# Patient Record
Sex: MALE | Race: WHITE | NOT HISPANIC OR LATINO | Employment: OTHER | URBAN - METROPOLITAN AREA
[De-identification: names, ages, dates, MRNs, and addresses within clinical notes are randomized per-mention and may not be internally consistent; named-entity substitution may affect disease eponyms.]

---

## 2018-12-14 NOTE — PATIENT DISCUSSION
Recommended observation. Pt reports stable. Edu if any growth, irritation, scabbing, bleeding, lash loss, RTC for excision biopsy c KK/[id number].

## 2019-02-19 ENCOUNTER — IMPORTED ENCOUNTER (OUTPATIENT)
Dept: URBAN - METROPOLITAN AREA CLINIC 50 | Facility: CLINIC | Age: 74
End: 2019-02-19

## 2020-03-24 ENCOUNTER — IMPORTED ENCOUNTER (OUTPATIENT)
Dept: URBAN - METROPOLITAN AREA CLINIC 50 | Facility: CLINIC | Age: 75
End: 2020-03-24

## 2020-05-28 ENCOUNTER — IMPORTED ENCOUNTER (OUTPATIENT)
Dept: URBAN - METROPOLITAN AREA CLINIC 50 | Facility: CLINIC | Age: 75
End: 2020-05-28

## 2020-06-02 ENCOUNTER — IMPORTED ENCOUNTER (OUTPATIENT)
Dept: URBAN - METROPOLITAN AREA CLINIC 50 | Facility: CLINIC | Age: 75
End: 2020-06-02

## 2020-06-17 ENCOUNTER — IMPORTED ENCOUNTER (OUTPATIENT)
Dept: URBAN - METROPOLITAN AREA CLINIC 50 | Facility: CLINIC | Age: 75
End: 2020-06-17

## 2020-06-25 ENCOUNTER — IMPORTED ENCOUNTER (OUTPATIENT)
Dept: URBAN - METROPOLITAN AREA CLINIC 50 | Facility: CLINIC | Age: 75
End: 2020-06-25

## 2020-06-25 NOTE — PATIENT DISCUSSION
"""S/P IOL OS: Tecnis ZCB00 24.0 (Target: [place]) +Femto/Arcs +Omidria. Continue post operative instructions and drops per schedule.  """

## 2020-07-01 ENCOUNTER — IMPORTED ENCOUNTER (OUTPATIENT)
Dept: URBAN - METROPOLITAN AREA CLINIC 50 | Facility: CLINIC | Age: 75
End: 2020-07-01

## 2020-07-01 NOTE — PATIENT DISCUSSION
"""S/P IOL OS: Tecnis ZCB00 24.0 (Target: [place]) +Femto/Arcs +Omidria. Continue post operative instructions and drops per schedule.  """

## 2020-07-09 ENCOUNTER — IMPORTED ENCOUNTER (OUTPATIENT)
Dept: URBAN - METROPOLITAN AREA CLINIC 50 | Facility: CLINIC | Age: 75
End: 2020-07-09

## 2020-07-09 NOTE — PATIENT DISCUSSION
"""S/P IOL OD: Tecnis ZCB00 24.0 (Target: [place]) +Femto/Arcs +Omidria. Continue post operative instructions and drops per schedule.  """

## 2020-07-14 ENCOUNTER — IMPORTED ENCOUNTER (OUTPATIENT)
Dept: URBAN - METROPOLITAN AREA CLINIC 50 | Facility: CLINIC | Age: 75
End: 2020-07-14

## 2020-07-14 NOTE — PATIENT DISCUSSION
"""S/P IOL OD: Tecnis ZCB00 24.0 (Target: [place]) +Femto/Arcs +Omidria. Continue post operative instructions and drops per schedule.  """

## 2020-08-19 ENCOUNTER — IMPORTED ENCOUNTER (OUTPATIENT)
Dept: URBAN - METROPOLITAN AREA CLINIC 50 | Facility: CLINIC | Age: 75
End: 2020-08-19

## 2020-08-19 NOTE — PATIENT DISCUSSION
"""""""S/P IOL OD: Tecnis ZCB00 24.0 (Target: [place]) +Femto/Arcs +Omidria. Continue post operative instructions and drops per schedule.  """

## 2020-09-18 ENCOUNTER — IMPORTED ENCOUNTER (OUTPATIENT)
Dept: URBAN - METROPOLITAN AREA CLINIC 50 | Facility: CLINIC | Age: 75
End: 2020-09-18

## 2020-10-30 ENCOUNTER — IMPORTED ENCOUNTER (OUTPATIENT)
Dept: URBAN - METROPOLITAN AREA CLINIC 50 | Facility: CLINIC | Age: 75
End: 2020-10-30

## 2021-02-19 ENCOUNTER — IMPORTED ENCOUNTER (OUTPATIENT)
Dept: URBAN - METROPOLITAN AREA CLINIC 50 | Facility: CLINIC | Age: 76
End: 2021-02-19

## 2021-04-17 ASSESSMENT — TONOMETRY
OS_IOP_MMHG: 18
OS_IOP_MMHG: 15
OD_IOP_MMHG: 16
OD_IOP_MMHG: 15
OD_IOP_MMHG: 18
OS_IOP_MMHG: 16
OS_IOP_MMHG: 15
OS_IOP_MMHG: 15
OD_IOP_MMHG: 15
OD_IOP_MMHG: 21
OS_IOP_MMHG: 19
OS_IOP_MMHG: 14
OD_IOP_MMHG: 16
OD_IOP_MMHG: 16
OS_IOP_MMHG: 21
OS_IOP_MMHG: 18
OS_IOP_MMHG: 17
OD_IOP_MMHG: 18
OD_IOP_MMHG: 20
OD_IOP_MMHG: 15

## 2021-04-17 ASSESSMENT — VISUAL ACUITY
OD_CC: 20/25-1
OD_CC: J1+
OS_BAT: 20/40
OS_CC: 20/25-2
OS_SC: 20/20 PUSH
OD_CC: J1+/-
OD_CC: 20/25-1
OD_CC: J1
OD_SC: 20/40
OS_SC: 20/30+2
OD_PH: 20/25
OS_OTHER: 20/40. 20/40.
OS_OTHER: 20/30. 20/40.
OD_BAT: 20/30
OD_BAT: 20/25
OS_BAT: 20/25
OD_OTHER: 20/50. 20/70.
OD_SC: 20/20-1
OD_BAT: 20/50
OS_CC: J1+
OS_BAT: 20/40
OS_CC: J1+/-
OD_CC: 20/30-1
OS_SC: 20/20
OS_OTHER: 20/40. 20/70.
OS_CC: J1
OD_OTHER: 20/30. 20/40.
OD_SC: 20/20
OD_PH: 20/40-2
OD_SC: 20/30
OD_PH: 20/30
OD_CC: 20/30+/-
OD_SC: 20/50+1
OD_SC: 20/70
OS_OTHER: 20/25. 20/40.
OD_CC: J1+
OD_OTHER: 20/50. 20/70.
OD_BAT: 20/50
OS_CC: J1+
OS_CC: 20/25-2
OD_OTHER: 20/25. 20/40.
OD_OTHER: 20/50. 20/70.
OS_BAT: 20/30
OS_CC: 20/30
OD_BAT: 20/50
OS_CC: 20/80
OS_SC: 20/20
OS_CC: 20/20
OS_PH: 20/25

## 2022-02-02 ENCOUNTER — ESTABLISHED PATIENT (OUTPATIENT)
Dept: URBAN - METROPOLITAN AREA CLINIC 53 | Facility: CLINIC | Age: 77
End: 2022-02-02

## 2022-02-02 DIAGNOSIS — H43.822: ICD-10-CM

## 2022-02-02 DIAGNOSIS — H35.373: ICD-10-CM

## 2022-02-02 DIAGNOSIS — H53.10: ICD-10-CM

## 2022-02-02 PROCEDURE — 92014 COMPRE OPH EXAM EST PT 1/>: CPT

## 2022-02-02 PROCEDURE — 92134 CPTRZ OPH DX IMG PST SGM RTA: CPT

## 2022-02-02 ASSESSMENT — VISUAL ACUITY
OS_SC: 20/20
OD_SC: 20/25
OD_GLARE: 20/30
OD_GLARE: 20/50

## 2022-02-02 ASSESSMENT — TONOMETRY
OS_IOP_MMHG: 17
OD_IOP_MMHG: 18

## 2022-02-02 NOTE — PATIENT DISCUSSION
Recommend patient monitor BP especially upon awakening and after walks when patient usually experiences starburst and visual disturbances. If BP is grossly elevated or low patient to call PCP or go to ER.

## 2022-04-06 ENCOUNTER — ESTABLISHED PATIENT (OUTPATIENT)
Dept: URBAN - METROPOLITAN AREA CLINIC 53 | Facility: CLINIC | Age: 77
End: 2022-04-06

## 2022-04-06 VITALS
BODY MASS INDEX: 24.34 KG/M2 | WEIGHT: 170 LBS | HEART RATE: 64 BPM | DIASTOLIC BLOOD PRESSURE: 76 MMHG | HEIGHT: 70 IN | SYSTOLIC BLOOD PRESSURE: 127 MMHG

## 2022-04-06 DIAGNOSIS — H35.033: ICD-10-CM

## 2022-04-06 DIAGNOSIS — H35.071: ICD-10-CM

## 2022-04-06 DIAGNOSIS — H35.373: ICD-10-CM

## 2022-04-06 DIAGNOSIS — H16.223: ICD-10-CM

## 2022-04-06 DIAGNOSIS — H43.822: ICD-10-CM

## 2022-04-06 DIAGNOSIS — H43.813: ICD-10-CM

## 2022-04-06 PROCEDURE — 92134 CPTRZ OPH DX IMG PST SGM RTA: CPT

## 2022-04-06 PROCEDURE — 92014 COMPRE OPH EXAM EST PT 1/>: CPT

## 2022-04-06 ASSESSMENT — VISUAL ACUITY
OU_SC: 20/20
OD_SC: 20/25-2
OS_SC: 20/20
OU_CC: 20/20
OD_CC: J1+
OU_SC: J3
OS_CC: J1+
OS_CC: 20/20
OD_GLARE: 20/40
OU_CC: J1+
OD_CC: 20/25
OS_GLARE: 20/25
OS_GLARE: 20/30
OD_GLARE: 20/30

## 2022-04-06 ASSESSMENT — TONOMETRY
OD_IOP_MMHG: 16
OS_IOP_MMHG: 16

## 2022-04-06 NOTE — PATIENT DISCUSSION
Long discussion with patient the shimmering can be caused by a migraine variant that can lost 10-15 mins which can be followed by a slight migraine.

## 2023-04-11 ENCOUNTER — COMPREHENSIVE EXAM (OUTPATIENT)
Dept: URBAN - METROPOLITAN AREA CLINIC 49 | Facility: CLINIC | Age: 78
End: 2023-04-11

## 2023-04-11 DIAGNOSIS — H35.373: ICD-10-CM

## 2023-04-11 DIAGNOSIS — H16.223: ICD-10-CM

## 2023-04-11 DIAGNOSIS — H35.033: ICD-10-CM

## 2023-04-11 DIAGNOSIS — H43.813: ICD-10-CM

## 2023-04-11 DIAGNOSIS — H35.071: ICD-10-CM

## 2023-04-11 DIAGNOSIS — H43.822: ICD-10-CM

## 2023-04-11 PROCEDURE — 92014 COMPRE OPH EXAM EST PT 1/>: CPT

## 2023-04-11 PROCEDURE — 92134 CPTRZ OPH DX IMG PST SGM RTA: CPT

## 2023-04-11 ASSESSMENT — VISUAL ACUITY
OS_GLARE: 20/25
OU_CC: 20/20
OD_CC: 20/20
OS_CC: 20/20
OD_CC: J1
OS_CC: J1+
OU_CC: J1+
OD_GLARE: 20/25
OD_GLARE: 20/25
OS_GLARE: 20/30

## 2023-04-11 ASSESSMENT — TONOMETRY
OS_IOP_MMHG: 16
OD_IOP_MMHG: 16

## 2024-03-22 ENCOUNTER — COMPREHENSIVE EXAM (OUTPATIENT)
Dept: URBAN - METROPOLITAN AREA CLINIC 53 | Facility: CLINIC | Age: 79
End: 2024-03-22

## 2024-03-22 DIAGNOSIS — H52.4: ICD-10-CM

## 2024-03-22 DIAGNOSIS — H53.10: ICD-10-CM

## 2024-03-22 DIAGNOSIS — H35.071: ICD-10-CM

## 2024-03-22 DIAGNOSIS — H26.491: ICD-10-CM

## 2024-03-22 DIAGNOSIS — H43.813: ICD-10-CM

## 2024-03-22 DIAGNOSIS — H35.373: ICD-10-CM

## 2024-03-22 DIAGNOSIS — H16.223: ICD-10-CM

## 2024-03-22 DIAGNOSIS — H43.822: ICD-10-CM

## 2024-03-22 DIAGNOSIS — H02.831: ICD-10-CM

## 2024-03-22 DIAGNOSIS — H02.834: ICD-10-CM

## 2024-03-22 DIAGNOSIS — H35.033: ICD-10-CM

## 2024-03-22 PROCEDURE — 92134 CPTRZ OPH DX IMG PST SGM RTA: CPT

## 2024-03-22 PROCEDURE — 92015 DETERMINE REFRACTIVE STATE: CPT

## 2024-03-22 PROCEDURE — 99214 OFFICE O/P EST MOD 30 MIN: CPT

## 2024-03-22 ASSESSMENT — VISUAL ACUITY
OU_CC: J1+
OS_SC: 20/20
OD_SC: 20/25-2

## 2024-03-22 ASSESSMENT — TONOMETRY
OD_IOP_MMHG: 16
OS_IOP_MMHG: 16

## 2025-04-10 ENCOUNTER — COMPREHENSIVE EXAM (OUTPATIENT)
Age: 80
End: 2025-04-10

## 2025-04-10 DIAGNOSIS — H16.223: ICD-10-CM

## 2025-04-10 DIAGNOSIS — H35.033: ICD-10-CM

## 2025-04-10 DIAGNOSIS — H43.822: ICD-10-CM

## 2025-04-10 DIAGNOSIS — H35.373: ICD-10-CM

## 2025-04-10 DIAGNOSIS — H53.10: ICD-10-CM

## 2025-04-10 DIAGNOSIS — H43.813: ICD-10-CM

## 2025-04-10 PROCEDURE — 99214 OFFICE O/P EST MOD 30 MIN: CPT

## 2025-04-10 PROCEDURE — 92134 CPTRZ OPH DX IMG PST SGM RTA: CPT
